# Patient Record
(demographics unavailable — no encounter records)

---

## 2025-03-10 NOTE — HEALTH RISK ASSESSMENT
[No] : In the past 12 months have you used drugs other than those required for medical reasons? No [0] : 2) Feeling down, depressed, or hopeless: Not at all (0) [PHQ-2 Negative - No further assessment needed] : PHQ-2 Negative - No further assessment needed [Never] : Never [de-identified] : tries to exercise  [de-identified] : balanced  [[id number]] : 0 no

## 2025-03-10 NOTE — PLAN
[FreeTextEntry1] : Hypothyroidism - f/u labs - cont meds  ADHD - cont adderall PRN  HCM - f/u labs - healthy diet and exercise - routine GYN

## 2025-03-10 NOTE — HISTORY OF PRESENT ILLNESS
[FreeTextEntry1] : CPE, establish care  [de-identified] : 32 year old female with pmhx of synthroid presents for CPE/establish care. Her previous PCP was managing her thyroid.  She was followed by psych. Had tried lexapro which was not helping. Her PCP was prescribing adderall. She feels better with adderall and only uses PRN.

## 2025-06-09 NOTE — PHYSICAL EXAM
[No Acute Distress] : no acute distress [Normal Sclera/Conjunctiva] : normal sclera/conjunctiva [Well-Appearing] : well-appearing [No Respiratory Distress] : no respiratory distress  [Normal Outer Ear/Nose] : the outer ears and nose were normal in appearance [No Accessory Muscle Use] : no accessory muscle use [Clear to Auscultation] : lungs were clear to auscultation bilaterally [Normal Rate] : normal rate  [Regular Rhythm] : with a regular rhythm [Normal Insight/Judgement] : insight and judgment were intact [Normal Affect] : the affect was normal

## 2025-06-09 NOTE — PLAN
[FreeTextEntry1] : ADHD - add adderall IR to be taken PRN - cont ER  Low sodium - level slightly low at 134 - can increase salt   Hypothyroidism  - cont current dose

## 2025-06-09 NOTE — HISTORY OF PRESENT ILLNESS
[FreeTextEntry1] : f/u  [de-identified] : 33 year old female presents for f/u for ADHD medication. She is doing well. Denies palpitations, headaches.  She states she feels medication helps but wears off in afternoon. Previously had an additional med to take if that happened. Of note, having hysterectomy this week. Had labs done including thyroid that were normal overall.